# Patient Record
Sex: FEMALE | Race: OTHER | URBAN - NONMETROPOLITAN AREA
[De-identification: names, ages, dates, MRNs, and addresses within clinical notes are randomized per-mention and may not be internally consistent; named-entity substitution may affect disease eponyms.]

---

## 2023-07-10 ENCOUNTER — OFFICE VISIT (OUTPATIENT)
Dept: URBAN - NONMETROPOLITAN AREA CLINIC 14 | Facility: CLINIC | Age: 68
End: 2023-07-10
Payer: OTHER GOVERNMENT

## 2023-07-10 DIAGNOSIS — H17.11 CENTRAL CORNEAL OPACITY OF RIGHT EYE: ICD-10-CM

## 2023-07-10 DIAGNOSIS — Z79.4 LONG TERM (CURRENT) USE OF INSULIN: ICD-10-CM

## 2023-07-10 DIAGNOSIS — H52.4 PRESBYOPIA: ICD-10-CM

## 2023-07-10 DIAGNOSIS — H25.813 COMBINED FORMS OF AGE-RELATED CATARACT, BILATERAL: ICD-10-CM

## 2023-07-10 DIAGNOSIS — E11.9 DIABETES MELLITUS TYPE 2 WITHOUT MENTION OF COMPLICATION: Primary | ICD-10-CM

## 2023-07-10 PROCEDURE — 92004 COMPRE OPH EXAM NEW PT 1/>: CPT | Performed by: OPTOMETRIST

## 2023-07-10 RX ORDER — PREDNISOLONE ACETATE 10 MG/ML
1 % SUSPENSION/ DROPS OPHTHALMIC
Qty: 10 | Refills: 0 | Status: ACTIVE
Start: 2023-07-10

## 2023-07-10 ASSESSMENT — INTRAOCULAR PRESSURE
OS: 12
OD: 11

## 2023-07-10 ASSESSMENT — VISUAL ACUITY
OS: 20/25
OD: 20/25

## 2023-07-10 NOTE — IMPRESSION/PLAN
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 Plan: Pt reports recent fall where she hit her right eye. Reports no history of trauma or scarring besides this last episode. Start Pred Forte QID OD. RTC 1 week for F/U or sooner with changes.

## 2023-07-10 NOTE — IMPRESSION/PLAN
Impression: Diabetes mellitus Type 2 without mention of complication: T71.8. Plan: Monitor x 1 year. Educated pt. on need for tight BG control, compliance with medication, and regular F/U with PCP.

## 2023-07-17 ENCOUNTER — OFFICE VISIT (OUTPATIENT)
Dept: URBAN - NONMETROPOLITAN AREA CLINIC 14 | Facility: CLINIC | Age: 68
End: 2023-07-17
Payer: MEDICARE

## 2023-07-17 DIAGNOSIS — H17.11 CENTRAL CORNEAL OPACITY OF RIGHT EYE: Primary | ICD-10-CM

## 2023-07-17 PROCEDURE — 99213 OFFICE O/P EST LOW 20 MIN: CPT | Performed by: OPTOMETRIST

## 2023-07-17 ASSESSMENT — INTRAOCULAR PRESSURE
OD: 8
OS: 8

## 2023-07-17 NOTE — IMPRESSION/PLAN
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 Plan: Mild improvement. Pt reports recent fall where she hit her right eye. Reports no history of trauma or scarring besides this last episode. Taper Pred Forte over next week. RTC PRN with changes.

## 2025-07-01 ENCOUNTER — OFFICE VISIT (OUTPATIENT)
Dept: URBAN - METROPOLITAN AREA CLINIC 30 | Facility: CLINIC | Age: 70
End: 2025-07-01
Payer: MEDICARE

## 2025-07-01 DIAGNOSIS — H04.123 DRY EYE SYNDROME OF BILATERAL LACRIMAL GLANDS: ICD-10-CM

## 2025-07-01 DIAGNOSIS — H25.813 COMBINED FORMS OF AGE-RELATED CATARACT, BILATERAL: ICD-10-CM

## 2025-07-01 DIAGNOSIS — H43.813 VITREOUS DEGENERATION, BILATERAL: ICD-10-CM

## 2025-07-01 DIAGNOSIS — H52.4 PRESBYOPIA: ICD-10-CM

## 2025-07-01 DIAGNOSIS — E11.9 DIABETES MELLITUS TYPE 2 WITHOUT MENTION OF COMPLICATION: Primary | ICD-10-CM

## 2025-07-01 PROCEDURE — 92134 CPTRZ OPH DX IMG PST SGM RTA: CPT

## 2025-07-01 PROCEDURE — 99214 OFFICE O/P EST MOD 30 MIN: CPT

## 2025-07-01 ASSESSMENT — INTRAOCULAR PRESSURE
OD: 18
OS: 15

## 2025-07-01 ASSESSMENT — VISUAL ACUITY: OD: 20/40
